# Patient Record
Sex: FEMALE | Race: WHITE
[De-identification: names, ages, dates, MRNs, and addresses within clinical notes are randomized per-mention and may not be internally consistent; named-entity substitution may affect disease eponyms.]

---
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
## 2020-04-22 ENCOUNTER — HOSPITAL ENCOUNTER (OUTPATIENT)
Dept: HOSPITAL 83 - MRI | Age: 20
End: 2020-04-22
Attending: FAMILY MEDICINE
Payer: COMMERCIAL

## 2020-04-22 DIAGNOSIS — N64.3: ICD-10-CM

## 2020-04-22 DIAGNOSIS — D35.2: Primary | ICD-10-CM

## 2020-06-25 ENCOUNTER — HOSPITAL ENCOUNTER (OUTPATIENT)
Dept: HOSPITAL 83 - US | Age: 20
Discharge: HOME | End: 2020-06-25
Attending: FAMILY MEDICINE
Payer: COMMERCIAL

## 2020-06-25 DIAGNOSIS — N64.4: ICD-10-CM

## 2020-06-25 DIAGNOSIS — N64.3: Primary | ICD-10-CM

## 2020-07-20 ENCOUNTER — HOSPITAL ENCOUNTER (EMERGENCY)
Dept: HOSPITAL 83 - ED | Age: 20
Discharge: HOME | End: 2020-07-20
Payer: COMMERCIAL

## 2020-07-20 VITALS — HEIGHT: 63.98 IN | BODY MASS INDEX: 35 KG/M2 | WEIGHT: 205 LBS

## 2020-07-20 VITALS — DIASTOLIC BLOOD PRESSURE: 68 MMHG
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
## 2020-07-20 DIAGNOSIS — J45.909: ICD-10-CM
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
## 2020-07-20 DIAGNOSIS — S90.31XA: Primary | ICD-10-CM

## 2020-09-01 RX ORDER — ARIPIPRAZOLE 5 MG/1
5 TABLET ORAL DAILY
COMMUNITY

## 2021-04-13 ENCOUNTER — HOSPITAL ENCOUNTER (EMERGENCY)
Dept: HOSPITAL 83 - ED | Age: 21
Discharge: HOME | End: 2021-04-13
Payer: COMMERCIAL

## 2021-04-13 VITALS — WEIGHT: 210 LBS | HEIGHT: 55 IN

## 2021-04-13 VITALS — SYSTOLIC BLOOD PRESSURE: 122 MMHG | DIASTOLIC BLOOD PRESSURE: 60 MMHG

## 2021-04-13 DIAGNOSIS — Z79.899: ICD-10-CM

## 2021-04-13 DIAGNOSIS — K21.9: ICD-10-CM

## 2021-04-13 DIAGNOSIS — M94.0: Primary | ICD-10-CM

## 2021-04-13 LAB
BASOPHILS # BLD AUTO: 0 10*3/UL (ref 0–0.1)
BASOPHILS NFR BLD AUTO: 0.4 % (ref 0–1)
BUN SERPL-MCNC: 10 MG/DL (ref 7–24)
CHLORIDE SERPL-SCNC: 107 MMOL/L (ref 98–107)
CREAT SERPL-MCNC: 0.84 MG/DL (ref 0.55–1.02)
EOSINOPHIL # BLD AUTO: 0.1 10*3/UL (ref 0–0.4)
EOSINOPHIL # BLD AUTO: 1.3 % (ref 1–4)
ERYTHROCYTE [DISTWIDTH] IN BLOOD BY AUTOMATED COUNT: 13.3 % (ref 0–14.5)
HCT VFR BLD AUTO: 42.3 % (ref 37–47)
LYMPHOCYTES # BLD AUTO: 2.2 10*3/UL (ref 1.3–4.4)
LYMPHOCYTES NFR BLD AUTO: 28.1 % (ref 27–41)
MCH RBC QN AUTO: 29.5 PG (ref 27–31)
MCHC RBC AUTO-ENTMCNC: 32.6 G/DL (ref 33–37)
MCV RBC AUTO: 90.4 FL (ref 81–99)
MONOCYTES # BLD AUTO: 0.4 10*3/UL (ref 0.1–1)
MONOCYTES NFR BLD MANUAL: 4.7 % (ref 3–9)
NEUT #: 5.1 10*3/UL (ref 2.3–7.9)
NEUT %: 65.4 % (ref 47–73)
NRBC BLD QL AUTO: 0 10*3/UL (ref 0–0)
PLATELET # BLD AUTO: 281 10*3/UL (ref 130–400)
PMV BLD AUTO: 10 FL (ref 9.6–12.3)
POTASSIUM SERPL-SCNC: 4 MMOL/L (ref 3.5–5.1)
RBC # BLD AUTO: 4.68 10*6/UL (ref 4.1–5.1)
SODIUM SERPL-SCNC: 139 MMOL/L (ref 136–145)
WBC NRBC COR # BLD AUTO: 7.8 10*3/UL (ref 4.8–10.8)

## 2021-08-12 ENCOUNTER — HOSPITAL ENCOUNTER (EMERGENCY)
Age: 21
Discharge: LEFT BEFORE DISCHARGE | End: 2021-08-12
Payer: COMMERCIAL

## 2021-08-12 VITALS
HEIGHT: 64 IN | TEMPERATURE: 98 F | WEIGHT: 210 LBS | OXYGEN SATURATION: 99 % | BODY MASS INDEX: 35.85 KG/M2 | HEART RATE: 118 BPM | RESPIRATION RATE: 18 BRPM | DIASTOLIC BLOOD PRESSURE: 63 MMHG | SYSTOLIC BLOOD PRESSURE: 126 MMHG

## 2021-08-13 ENCOUNTER — HOSPITAL ENCOUNTER (EMERGENCY)
Dept: HOSPITAL 83 - ED | Age: 21
LOS: 1 days | Discharge: HOME | End: 2021-08-14
Payer: COMMERCIAL

## 2021-08-13 VITALS — BODY MASS INDEX: 35.85 KG/M2 | WEIGHT: 210 LBS | HEIGHT: 63.98 IN

## 2021-08-13 VITALS — DIASTOLIC BLOOD PRESSURE: 63 MMHG

## 2021-08-13 DIAGNOSIS — G43.909: ICD-10-CM

## 2021-08-13 DIAGNOSIS — O26.891: Primary | ICD-10-CM

## 2021-08-13 DIAGNOSIS — K21.9: ICD-10-CM

## 2021-08-13 LAB
BASOPHILS # BLD AUTO: 0.1 10*3/UL (ref 0–0.1)
BASOPHILS NFR BLD AUTO: 0.4 % (ref 0–1)
EOSINOPHIL # BLD AUTO: 0.1 10*3/UL (ref 0–0.4)
EOSINOPHIL # BLD AUTO: 0.6 % (ref 1–4)
ERYTHROCYTE [DISTWIDTH] IN BLOOD BY AUTOMATED COUNT: 13.5 % (ref 0–14.5)
HCT VFR BLD AUTO: 38.7 % (ref 37–47)
LYMPHOCYTES # BLD AUTO: 2.3 10*3/UL (ref 1.3–4.4)
LYMPHOCYTES NFR BLD AUTO: 16.9 % (ref 27–41)
MCH RBC QN AUTO: 29.6 PG (ref 27–31)
MCHC RBC AUTO-ENTMCNC: 32.6 G/DL (ref 33–37)
MCV RBC AUTO: 91.1 FL (ref 81–99)
MONOCYTES # BLD AUTO: 0.7 10*3/UL (ref 0.1–1)
MONOCYTES NFR BLD MANUAL: 4.9 % (ref 3–9)
NEUT #: 10.4 10*3/UL (ref 2.3–7.9)
NEUT %: 76.8 % (ref 47–73)
NRBC BLD QL AUTO: 0 % (ref 0–0)
PLATELET # BLD AUTO: 278 10*3/UL (ref 130–400)
PMV BLD AUTO: 10.3 FL (ref 9.6–12.3)
RBC # BLD AUTO: 4.25 10*6/UL (ref 4.1–5.1)
WBC NRBC COR # BLD AUTO: 13.6 10*3/UL (ref 4.8–10.8)

## 2021-09-03 ENCOUNTER — HOSPITAL ENCOUNTER (EMERGENCY)
Dept: HOSPITAL 97 - ER | Age: 21
LOS: 1 days | Discharge: HOME | End: 2021-09-04
Payer: COMMERCIAL

## 2021-09-03 DIAGNOSIS — O23.01: Primary | ICD-10-CM

## 2021-09-03 DIAGNOSIS — Z3A.00: ICD-10-CM

## 2021-09-03 DIAGNOSIS — Z20.822: ICD-10-CM

## 2021-09-03 DIAGNOSIS — N12: ICD-10-CM

## 2021-09-03 LAB
ALBUMIN SERPL BCP-MCNC: 3.2 G/DL (ref 3.4–5)
ALP SERPL-CCNC: 87 U/L (ref 45–117)
ALT SERPL W P-5'-P-CCNC: 36 U/L (ref 12–78)
AST SERPL W P-5'-P-CCNC: 18 U/L (ref 15–37)
BUN BLD-MCNC: 11 MG/DL (ref 7–18)
GLUCOSE SERPLBLD-MCNC: 108 MG/DL (ref 74–106)
HCT VFR BLD CALC: 38.3 % (ref 36–45)
LYMPHOCYTES # SPEC AUTO: 1.9 K/UL (ref 0.7–4.9)
PMV BLD: 8.8 FL (ref 7.6–11.3)
POTASSIUM SERPL-SCNC: 3.4 MMOL/L (ref 3.5–5.1)
RBC # BLD: 4.37 M/UL (ref 3.86–4.86)
SP GR UR: 1.02 (ref 1–1.03)

## 2021-09-03 PROCEDURE — 83605 ASSAY OF LACTIC ACID: CPT

## 2021-09-03 PROCEDURE — 84702 CHORIONIC GONADOTROPIN TEST: CPT

## 2021-09-03 PROCEDURE — 87088 URINE BACTERIA CULTURE: CPT

## 2021-09-03 PROCEDURE — 36415 COLL VENOUS BLD VENIPUNCTURE: CPT

## 2021-09-03 PROCEDURE — 96365 THER/PROPH/DIAG IV INF INIT: CPT

## 2021-09-03 PROCEDURE — 76817 TRANSVAGINAL US OBSTETRIC: CPT

## 2021-09-03 PROCEDURE — 96375 TX/PRO/DX INJ NEW DRUG ADDON: CPT

## 2021-09-03 PROCEDURE — 81003 URINALYSIS AUTO W/O SCOPE: CPT

## 2021-09-03 PROCEDURE — 87040 BLOOD CULTURE FOR BACTERIA: CPT

## 2021-09-03 PROCEDURE — 99284 EMERGENCY DEPT VISIT MOD MDM: CPT

## 2021-09-03 PROCEDURE — 80053 COMPREHEN METABOLIC PANEL: CPT

## 2021-09-03 PROCEDURE — 86900 BLOOD TYPING SEROLOGIC ABO: CPT

## 2021-09-03 PROCEDURE — 85025 COMPLETE CBC W/AUTO DIFF WBC: CPT

## 2021-09-03 PROCEDURE — 87086 URINE CULTURE/COLONY COUNT: CPT

## 2021-09-03 PROCEDURE — 81015 MICROSCOPIC EXAM OF URINE: CPT

## 2021-09-03 PROCEDURE — 76775 US EXAM ABDO BACK WALL LIM: CPT

## 2021-09-03 PROCEDURE — 86901 BLOOD TYPING SEROLOGIC RH(D): CPT

## 2021-09-03 PROCEDURE — 81025 URINE PREGNANCY TEST: CPT

## 2021-09-04 VITALS — SYSTOLIC BLOOD PRESSURE: 100 MMHG | OXYGEN SATURATION: 99 % | TEMPERATURE: 98.4 F | DIASTOLIC BLOOD PRESSURE: 59 MMHG

## 2021-09-04 NOTE — EDPHYS
Physician Documentation                                                                           

 [facility]                                                                 

Name: Alyssa Chatterjee                                                                           

Age: 21 yrs                                                                                       

Sex: Female                                                                                       

: 2000                                                                                   

MRN: [id number]                                                                                   

Arrival Date: 2021                                                                          

Time: 20:40                                                                                       

Account#: [id number]                                                                            

Bed Treatment                                                                                     

Private MD:                                                                                       

ED Physician Spencer Meng                                                                      

HPI:                                                                                              

                                                                                             

22:10 This 21 yrs old  Female presents to ER via Wheelchair with complaints of       mh7 

      Urinary Problem, Vomiting.                                                                  

22:10 The patient complains of pain in the right flank.                                       mh7 

22:10 The pain radiates to the Lower abdomen. Onset: The symptoms/episode began/occurred 3    mh7 

      day(s) ago. Modifying factors: The symptoms are alleviated by nothing. the symptoms are     

      aggravated by movement, palpation/percussion. Associated signs and symptoms: Pertinent      

      positives: dysuria, fever, hematuria, nausea, vomiting, Pertinent negatives: diarrhea,      

      dizziness, headache, pain radiating to the lower extremities. Severity of pain: At its      

      worst the pain was moderate 2 day(s) ago, in the emergency department the pain is           

      unchanged. The patient has been recently seen by a physician: earlier today, Seen at an     

      outside hospital ED.                                                                        

                                                                                             

01:54 Patient reports having a miscarriage 2021. She states that she recently saw an     7 

      OB/GYN and has had a positive pregnancy test and beta hCG of nearly 500 in the past         

      week..                                                                                      

                                                                                                  

OB/GYN:                                                                                           

                                                                                             

21:14 Pregnancy Verified                                                                      kg  

                                                                                                  

Historical:                                                                                       

- Allergies:                                                                                      

21:14 No Known Allergies;                                                                     kg  

- Home Meds:                                                                                      

21:14 None [Active];                                                                          kg  

- PMHx:                                                                                           

21:14 Anxiety; Depressive disorder; Asthma; PCOS;                                             kg  

- PSHx:                                                                                           

21:14 None;                                                                                   kg  

                                                                                                  

- Immunization history:: Adult Immunizations not up to date, Client reports having NOT            

  received the Covid vaccine.                                                                     

- Social history:: Smoking status: Patient denies any tobacco usage or history of.                

                                                                                                  

                                                                                                  

ROS:                                                                                              

22:10 Eyes: Negative for injury, pain, redness, and discharge, ENT: Negative for injury,      mh7 

      pain, and discharge, Neck: Negative for injury, pain, and swelling, Cardiovascular:         

      Negative for chest pain, palpitations, and edema, Respiratory: Negative for shortness       

      of breath, cough, wheezing, and pleuritic chest pain, MS/Extremity: Negative for injury     

      and deformity, Skin: Negative for injury, rash, and discoloration, Neuro: Negative for      

      headache, weakness, numbness, tingling, and seizure, Psych: Negative for depression,        

      anxiety, suicide ideation, homicidal ideation, and hallucinations, Allergy/Immunology:      

      Negative for hives, rash, and allergies, Endocrine: Negative for neck swelling,             

      polydipsia, polyuria, polyphagia, and marked weight changes, Hematologic/Lymphatic:         

      Negative for swollen nodes, abnormal bleeding, and unusual bruising.                        

                                                                                                  

Exam:                                                                                             

22:10 Constitutional:  This is a well developed, well nourished patient who is awake, alert,  mh7 

      and in no acute distress. Head/Face:  Normocephalic, atraumatic. Eyes:  Pupils equal        

      round and reactive to light, extra-ocular motions intact.  Lids and lashes normal.          

      Conjunctiva and sclera are non-icteric and not injected.  Cornea within normal limits.      

      Periorbital areas with no swelling, redness, or edema. Neck:  Trachea midline, no           

      thyromegaly or masses palpated, and no cervical lymphadenopathy.  Supple, full range of     

      motion without nuchal rigidity, or vertebral point tenderness.  No Meningismus.             

      Chest/axilla:  Normal chest wall appearance and motion.  Nontender with no deformity.       

      No lesions are appreciated. Respiratory:  Lungs have equal breath sounds bilaterally,       

      clear to auscultation and percussion.  No rales, rhonchi or wheezes noted.  No              

      increased work of breathing, no retractions or nasal flaring.                               

22:10 Skin:  Warm, dry with normal turgor.  Normal color with no rashes, no lesions, and no       

      evidence of cellulitis. MS/ Extremity:  Pulses equal, no cyanosis.  Neurovascular           

      intact.  Full, normal range of motion. Neuro:  Awake and alert, GCS 15, oriented to         

      person, place, time, and situation.  Cranial nerves II-XII grossly intact.  Motor           

      strength 5/5 in all extremities.  Sensory grossly intact.  Cerebellar exam normal.          

      Normal gait. Psych:  Awake, alert, with orientation to person, place and time.              

      Behavior, mood, and affect are within normal limits.                                        

22:10 Abdomen/GI: Inspection: obese Bowel sounds: normal, in all quadrants, Palpation: mild       

      abdominal tenderness, in the suprapubic area, mass, is not appreciated, rebound             

      tenderness, is not appreciated, voluntary guarding, is not appreciated, involuntary         

      guarding, is not appreciated, no appreciated organomegaly, Rectal exam: the exam is         

      deferred, because of patient request, Indicators: McBurney's point is not tender,           

      Hylton's sign is negative, Rovsing's sign is negative, Obturator sign is negative,          

      Psoas sign is negative, Liver: no appreciated palpable abnormalities, Hernia: not           

      appreciated.                                                                                

22:10 Back: ROM is normal, normal spinal alignment noted, CVA tenderness, that is mild, is    mh7 

      noted on the right, muscle spasm, is not present.                                           

                                                                                                  

Vital Signs:                                                                                      

21:12  / 64; Pulse 121; Resp 20; Temp 100.1; Pulse Ox 100% on R/A; Weight 95.25 kg (R); kg  

      Height 5 ft. 4 in. (162.56 cm) (R); Pain 10/10;                                             

23:35  / 76; Pulse 102; Resp 18; Pulse Ox 100% on R/A;                                  em  

                                                                                             

01:49  / 59; Pulse 102; Resp 16 S; Temp 98.4; Pulse Ox 99% ;                            bb  

                                                                                             

21:12 Body Mass Index 36.05 (95.25 kg, 162.56 cm)                                             kg  

                                                                                                  

MDM:                                                                                              

01:54 Differential diagnosis: nephrolithiasis, pyelonephritis, UTI. Data reviewed: vital      mh7 

      signs, nurses notes, lab test result(s), Beta HCG: CBC, electrolytes, urinalysis,           

      radiologic studies, ultrasound. Data interpreted: Pulse oximetry: on room air is 99 %.      

      Interpretation: normal. Counseling: I had a detailed discussion with the patient and/or     

      guardian regarding: the historical points, exam findings, and any diagnostic results        

      supporting the discharge/admit diagnosis, lab results, radiology results. Response to       

      treatment: the patient's symptoms have markedly improved after treatment, the patient       

      is now symptom free. Refusal of service: The patient/guardian displays adequate             

      decision making capability and despite a detailed discussion of alternatives, benefits,     

      risks, and consequences refuses: Admission to the hospital for further work-up and          

      treatment.                                                                                  

01:54 ED course: Well-appearing, no acute distress, vital signs stable, no focal neurological mh7 

      deficits. Patient reports baseline heart rate usually in the 110's-120's. Discussed all     

      test results and findings with the patient and recommendation for admission with            

      current diagnosis due to possible early pregnancy. Patient declines admission stating       

      that she feels well and wants to try oral medication at home. She will follow up with       

      her OB/GYN doctor but will return to ER if worsening symptoms or other urgent concerns..    

02:01 Patient medically screened.                                                             [facility] 

                                                                                                  

                                                                                             

20:40 Order name: CBC with Automated Diff; Complete Time: 21:56                               [facility]

                                                                                             

20:40 Order name: Comprehensive Metabolic Panel; Complete Time: 23:26                         [facility]

                                                                                             

20:40 Order name: Urine Dipstick-Ancillary; Complete Time: 21:56                              [facility]

                                                                                             

20:55 Order name: Lactate                                                                       

                                                                                             

20:55 Order name: Blood Culture Adult (2)                                                       

                                                                                             

20:55 Order name: Lactate; Complete Time: 21:56                                               [facility]

                                                                                             

20:56 Order name: Urine Pregnancy--Ancillary (enter results); Complete Time: 22:16            [facility] 

                                                                                             

21:57 Order name: Urine Microscopic Only; Complete Time: 00:08                                [facility] 

                                                                                             

21:57 Order name: Urine Culture                                                               [facility] 

                                                                                             

22:17 Order name: HCG-Quantitative; Complete Time: 23:26                                      [facility] 

                                                                                             

22:17 Order name: Abo/rh Typing; Complete Time: 23:26                                         [facility] 

                                                                                             

00:31 Order name: SARS-COV-2 RT PCR; Complete Time: 00:44                                     [facility]

                                                                                             

01:37 Order name: ABO/RH no charge; Complete Time: 01:41                                      [facility]

                                                                                             

22:17 Order name: US Transvaginal Ob                                                          [facility] 

                                                                                             

23:14 Order name: Renal Ultrasound-Limited                                                    [facility]

                                                                                                  

Administered Medications:                                                                         

                                                                                             

20:55 Drug: Tylenol 650 mg Route: PO;                                                         kg  

23:20 Drug: Rocephin (cefTRIAXone) 1 grams Route: IV; Rate: per protocol; Site: left          kg  

      antecubital;                                                                                

                                                                                             

00:09 Follow up: Response: No adverse reaction; IV Status: Completed infusion; IV Intake: 10mlem  

                                                                                             

23:25 Drug: Zofran (Ondansetron) 4 mg Route: IVP; Site: left antecubital;                     kg  

                                                                                             

00:09 Follow up: Response: No adverse reaction                                                em  

                                                                                             

23:27 Drug: NS 0.9% 1000 ml Route: IV; Rate: 1000 ml; Site: left antecubital;                 kg  

                                                                                             

00:09 Follow up: IV Status: Completed infusion; IV Intake: 1000ml                             em  

                                                                                             

23:27 Drug: morphine 4 mg Route: IVP; Site: left antecubital;                                 kg  

                                                                                             

00:09 Follow up: Response: No adverse reaction; Marked relief of symptoms; Pain is decreased; em  

      RASS: Alert and Calm (0)                                                                    

                                                                                                  

                                                                                                  

Disposition Summary:                                                                              

21 02:01                                                                                    

Discharge Ordered                                                                                 

      Location: Home                                                                          [facility] 

      Problem: new                                                                            [facility] 

      Symptoms: have improved                                                                 [facility] 

      Condition: Stable                                                                       [facility] 

      Diagnosis                                                                                   

        - Pyelonephritis                                                                      [facility] 

      Followup:                                                                               [facility] 

        - With: Private Physician                                                                  

        - When: 1 - 2 days                                                                         

        - Reason: If symptoms return, Worsening of condition, Recheck today's complaints,          

      Continuance of care, Re-evaluation by your physician                                        

      Followup:                                                                               [facility] 

        - With: Rishi Nicolas MD                                                                

        - When: 1 - 2 days                                                                         

        - Reason: Worsening of condition, Recheck today's complaints                               

      Discharge Instructions:                                                                     

        - Discharge Summary Sheet                                                             [facility] 

        - Pyelonephritis, Adult, Easy-to-Read                                                 [facility] 

        - Pyelonephritis During Pregnancy                                                     [facility] 

      Forms:                                                                                      

        - Medication Reconciliation Form                                                      [facility] 

        - Thank You Letter                                                                    [facility] 

        - Antibiotic Education                                                                [facility] 

        - Prescription Opioid Use                                                             [facility] 

      Prescriptions:                                                                              

        - ondansetron 4 mg Oral tablet,disintegrating                                              

            - place 1 tablet by TRANSLINGUAL route every 8 hours As needed; 10 tablet;        [facility] 

      Refills: 0, Product Selection Permitted                                                     

        - Augmentin 875-125 mg Oral Tablet                                                         

            - take 1 tablet by ORAL route every 12 hours for 14 days; 28 tablet; Refills: 0,  [facility] 

      Product Selection Permitted                                                                 

        - Tylenol-Codeine #3 300 mg-30 mg Oral                                                     

            - take 2 tablet by ORAL route every 6 hours As needed; 20 tablet; Refills: 0,     [facility] 

      Product Selection Permitted                                                                 

Signatures:                                                                                       

Dispatcher [employer]                           EDMS                                                 

Domingo Boom Valenzuela, FNP-C                      FNP-Cla1                                                  

Spencer Meng MD MD   7                                                  

Lorena Joseph RN                     RN   kg                                                   

Jacky Hwang RN   em                                                   

                                                                                                  

Corrections: (The following items were deleted from the chart)                                    

                                                                                             

22:36 20:57 CORONAVIRUS+MR.LAB.BRZ ordered. EDMS                                              EDMS

23:14 22:19 Abdomen Limited+US.RAD.BRZ ordered. EDMS                                          EDMS

                                                                                                  

**************************************************************************************************

## 2021-09-04 NOTE — ER
Nurse's Notes                                                                                     

 [facility] [place]                                                                 

Name: Alyssa Chatterjee                                                                           

Age: 21 yrs                                                                                       

Sex: Female                                                                                       

: 2000                                                                                   

MRN: [id number]                                                                                   

Arrival Date: 2021                                                                          

Time: 20:40                                                                                       

Account#: [id number]                                                                            

Bed Treatment                                                                                     

Private MD:                                                                                       

Diagnosis: Pyelonephritis                                                                         

                                                                                                  

Presentation:                                                                                     

                                                                                             

21:12 Chief complaint: Patient states: I was diagnosed with kidney infection this morning at  [facility]. They gave me a shot of antibiotic and gave me a                

      prescription. I haven't gotten the prescription filled and I just fell worse.               

      Coronavirus screen: Vaccine status: Patient reports being unvaccinated. Ebola Screen:       

      Patient negative for fever greater than or equal to 101.5 degrees Fahrenheit, and           

      additional compatible Ebola Virus Disease symptoms Patient denies exposure to               

      infectious person. Patient denies travel to an Ebola-affected area in the 21 days           

      before illness onset. Initial Sepsis Screen: Does the patient meet any 2 criteria? HR >     

      90 bpm. No. Patient's initial sepsis screen is negative. Does the patient have a            

      suspected source of infection? Yes: Dysuria/Frequency/Urgency/UTI. Risk Assessment: Do      

      you want to hurt yourself or someone else? Patient reports no desire to harm self or        

      others. Onset of symptoms was September 3, 2021.                                            

21:12 Method Of Arrival: Wheelchair                                                           kg  

21:12 Acuity: ROSARIO 3                                                                           kg  

                                                                                                  

Triage Assessment:                                                                                

21:14 General: Appears in no apparent distress. Behavior is calm, cooperative, appropriate    kg  

      for age. Pain: Complains of pain in Right lower back, Righ flank, right lower abdomen       

      Pain currently is 10 out of 10 on a pain scale. GI: Reports lower abdominal pain. :       

      Reports pain flank(s), in lower back with urination, Pain is 10 out of 10 on a pain         

      scale.                                                                                      

                                                                                                  

OB/GYN:                                                                                           

21:14 Pregnancy Verified                                                                      kg  

                                                                                                  

Historical:                                                                                       

- Allergies:                                                                                      

21:14 No Known Allergies;                                                                     kg  

- Home Meds:                                                                                      

21:14 None [Active];                                                                          kg  

- PMHx:                                                                                           

21:14 Anxiety; Depressive disorder; Asthma; PCOS;                                             kg  

- PSHx:                                                                                           

21:14 None;                                                                                   kg  

                                                                                                  

- Immunization history:: Adult Immunizations not up to date, Client reports having NOT            

  received the Covid vaccine.                                                                     

- Social history:: Smoking status: Patient denies any tobacco usage or history of.                

                                                                                                  

                                                                                                  

Screenin:15 Abuse screen: Denies threats or abuse. Nutritional screening: No deficits noted.        em  

      Tuberculosis screening: No symptoms or risk factors identified. Fall Risk None              

      identified.                                                                                 

                                                                                                  

Assessment:                                                                                       

22:15 General: Appears in no apparent distress. uncomfortable, Behavior is calm, cooperative, em  

      appropriate for age. Pain: Complains of pain in suprapubic area. Neuro: Level of            

      Consciousness is awake, alert, obeys commands, Oriented to person, place, time,             

      situation. Cardiovascular: Capillary refill < 3 seconds Patient's skin is warm and dry.     

      Respiratory: Airway is patent Respiratory effort is even, unlabored, Respiratory            

      pattern is regular, symmetrical. GI: Abdomen is obese. : Reports burning with             

      urination. Derm: Skin is intact, is healthy with good turgor, Skin is pink, warm \T\ dry.   

      Musculoskeletal: Capillary refill < 3 seconds, Range of motion: intact in all               

      extremities.                                                                                

23:35 Reassessment: Patient appears in no apparent distress at this time. Patient and/or      em  

      family updated on plan of care and expected duration. Pain level reassessed. Patient is     

      alert, oriented x 3, equal unlabored respirations, skin warm/dry/pink. Patient states       

      feeling better.                                                                             

                                                                                             

01:20 Reassessment: Patient appears in no apparent distress at this time. Patient and/or      em  

      family updated on plan of care and expected duration. Pain level reassessed. Patient is     

      alert, oriented x 3, equal unlabored respirations, skin warm/dry/pink.                      

02:30 Reassessment: Patient is alert, oriented x 3, equal unlabored respirations, skin        bb  

      warm/dry/pink. pt verbalized understanding of and agrees to plan of care discharge          

      instructions given pt ambulated with steady gait to exit.                                   

                                                                                                  

Vital Signs:                                                                                      

                                                                                             

21:12  / 64; Pulse 121; Resp 20; Temp 100.1; Pulse Ox 100% on R/A; Weight 95.25 kg (R); kg  

      Height 5 ft. 4 in. (162.56 cm) (R); Pain 10/10;                                             

23:35  / 76; Pulse 102; Resp 18; Pulse Ox 100% on R/A;                                  em  

                                                                                             

01:49  / 59; Pulse 102; Resp 16 S; Temp 98.4; Pulse Ox 99% ;                            bb  

                                                                                             

21:12 Body Mass Index 36.05 (95.25 kg, 162.56 cm)                                             kg  

                                                                                                  

ED Course:                                                                                        

                                                                                             

20:40 Patient arrived in ED.                                                                  mr  

21:14 Triage completed.                                                                       kg  

21:14 Arm band placed on right wrist.                                                         kg  

21:16 Inserted saline lock: 20 gauge in left antecubital area, using aseptic technique.       kg  

21:50 Spencer Meng MD is Attending Physician.                                             [id number] 

22:04 Jacky Hwang, RN is Primary Nurse.                                                      em  

22:15 Patient has correct armband on for positive identification.                             em  

22:56 US Transvaginal Ob In Process Unspecified.                                              EDMS

23:14 Renal Ultrasound-Limited In Process Unspecified.                                        EDMS

                                                                                             

02:00 Rishi Nicolas MD is Referral Physician.                                              7 

02:29 IV discontinued, intact, bleeding controlled, No redness/swelling at site. Pressure     bb  

      dressing applied.                                                                           

02:32 No provider procedures requiring assistance completed.                                  bb  

                                                                                                  

Administered Medications:                                                                         

                                                                                             

20:55 Drug: Tylenol 650 mg Route: PO;                                                         kg  

23:20 Drug: Rocephin (cefTRIAXone) 1 grams Route: IV; Rate: per protocol; Site: left          kg  

      antecubital;                                                                                

                                                                                             

00:09 Follow up: Response: No adverse reaction; IV Status: Completed infusion; IV Intake: 10mlem  

                                                                                             

23:25 Drug: Zofran (Ondansetron) 4 mg Route: IVP; Site: left antecubital;                     kg  

                                                                                             

00:09 Follow up: Response: No adverse reaction                                                em  

                                                                                             

23:27 Drug: NS 0.9% 1000 ml Route: IV; Rate: 1000 ml; Site: left antecubital;                 kg  

                                                                                             

00:09 Follow up: IV Status: Completed infusion; IV Intake: 1000ml                             em  

                                                                                             

23:27 Drug: morphine 4 mg Route: IVP; Site: left antecubital;                                 kg  

                                                                                             

00:09 Follow up: Response: No adverse reaction; Marked relief of symptoms; Pain is decreased; em  

      RASS: Alert and Calm (0)                                                                    

                                                                                                  

                                                                                                  

Intake:                                                                                           

00:09 IV: 1000ml; Total: 1000ml.                                                              em  

00:09 IV: 10ml; Total: 1010ml.                                                                em  

                                                                                                  

Outcome:                                                                                          

02:01 Discharge ordered by MD.                                                                7 

02:32 Discharged to home ambulatory, with family.                                             bb  

02:32 Condition: stable                                                                           

02:32 Discharge instructions given to patient, Instructed on discharge instructions, follow       

      up and referral plans. medication usage, Demonstrated understanding of instructions,        

      follow-up care, medications, Prescriptions given X 3.                                       

02:32 Patient left the ED.                                                                    bb  

                                                                                                  

Signatures:                                                                                       

Dispatcher [employer]                           ANTONIO                                                 

DomingoJanette                                 mr HwangJacky, RN                        RN   Indy Valera RN                     RN   Spencer Otto MD MD   [facility]                                                  

Lorena Joseph RN                     RN   kg                                                   

                                                                                                  

**************************************************************************************************

## 2021-09-04 NOTE — RAD REPORT
EXAM DESCRIPTION:  US - Transvaginal OB - 9/3/2021 10:57 pm

 

CLINICAL HISTORY:  21 years Female PAIN, LMP: 6/28/2021. Patient states miscarriage on 8/13/2021. Pos
itive pregnancy test 3 days ago and today in the emergency room. History of PCOS.

 

TECHNIQUE:  Ultrasound imaging of the pelvis was performed transabdominally and endovaginally on 9/3/
2021 at 10: 41 PM.

 

COMPARISON:  None

 

FINDINGS:  The uterus is normal in size, shape and echogenicity and measures 6.8 x 3.8 x 5.2 cm.   Th
e endometrial complex was not measured but appears to be within normal limits.   There is no endometr
ial fluid. There is no gestational sac within the endometrial canal. There is a small focal anechoic 
area within the lower uterine segment measuring approximately 0.7 x 0.2 x 0.5 cm.

The right ovary measures 3.7 x 1.7 x 2.8 cm.   The right ovary contains normal follicles.   Doppler i
maging demonstrates normal spectral and color Doppler flow.

The left ovary measures 3.0 x 1.2 x 2.2 cm.   The left ovary contains normal follicles.   Doppler alexandria
ging demonstrates normal spectral waveforms and color Doppler flow.

There is a small amount of free fluid surrounding the right ovary.

 

IMPRESSION:  1. No evidence of an intrauterine gestational sac. There is a small focal anechoic area 
in the lower uterine segment with an average diameter of 0.5 cm. This could represent a nabothian cys
t or low position of a very early gestational sac.

2. Grossly normal sonographic evaluation of the ovaries.

3. Small amount of free fluid surrounding the right ovary.

 

Electronically signed by:   Hayley Alvarez DO   9/3/2021 11:46 PM CDT Workstation: [id number]

 

 

Due to temporary technical issues with the PACS/Fluency reporting system, reports are being signed by
 the in house radiologists without review as a courtesy to insure prompt reporting. The interpreting 
radiologist is fully responsible for the content of the report.

## 2021-09-04 NOTE — RAD REPORT
EXAM DESCRIPTION:  US - Renal Ultrasound-Limited - 9/3/2021 11:15 pm

 

CLINICAL HISTORY:  FLANK PAIN

 

COMPARISON:  None.

 

TECHNIQUE:  Real-time sonographic images of the retroperitoneum were obtained using a curved multiher
tz transducer.

 

FINDINGS:  The visualized portions of the aorta and IVC are unremarkable.

The right kidney measures 12.9 cm in length. The left kidney measures 11.0 cm in length. Mild right h
ydronephrosis. No left hydronephrosis. No solid renal mass.

The urinary bladder is nonvisualized.

 

IMPRESSION:  1. Mild right hydronephrosis.

 

Electronically signed by:   Ac Dominguez   9/4/2021 12:58 AM CDT Workstation: [id number]

 

 

 

Due to temporary technical issues with the PACS/Fluency reporting system, reports are being signed by
 the in house radiologists without review as a courtesy to insure prompt reporting. The interpreting 
radiologist is fully responsible for the content of the report.

## 2021-12-06 ENCOUNTER — HOSPITAL ENCOUNTER (EMERGENCY)
Dept: HOSPITAL 97 - ER | Age: 21
Discharge: HOME | End: 2021-12-06
Payer: COMMERCIAL

## 2021-12-06 VITALS — DIASTOLIC BLOOD PRESSURE: 72 MMHG | SYSTOLIC BLOOD PRESSURE: 113 MMHG | OXYGEN SATURATION: 100 % | TEMPERATURE: 98.6 F
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
## 2021-12-06 DIAGNOSIS — S90.32XA: Primary | ICD-10-CM
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
## 2021-12-06 PROCEDURE — 99283 EMERGENCY DEPT VISIT LOW MDM: CPT

## 2021-12-06 NOTE — EDPHYS
Physician Documentation                                                                           

 [facility]                                                                 

Name: Alyssa Chatterjee                                                                           

Age: 21 yrs                                                                                       

Sex: Female                                                                                       

: 2000                                                                                   

MRN: [id number]                                                                                   

Arrival Date: 2021                                                                          

Time: 08:27                                                                                       

Account#: [id number]                                                                            

Bed Waiting                                                                                       

Private MD:                                                                                       

KYLE Physician Ajit Lara                                                                      

HPI:                                                                                              

                                                                                             

09:39 This 21 yrs old Female presents to ER via Wheelchair with complaints of Foot Injury.    kb  

09:39 The patient presents with pain, swelling, tenderness. The complaints affect the left    kb  

      foot. Context: The problem was sustained at work, resulted from wheelchair rolled over      

      foot, the patient can fully bear weight, the patient is able to ambulate. Onset: The        

      symptoms/episode began/occurred 5 day(s) ago. Modifying factors: The symptoms are           

      alleviated by nothing, the symptoms are aggravated by weight bearing, movement.             

      Associated signs and symptoms: Pertinent positives: swelling, Pertinent negatives: calf     

      tenderness, fever, nausea, numbness, rash, tingling, vomiting, warmth, weakness.            

      Severity of symptoms: At their worst the symptoms were moderate, in the emergency           

      department the symptoms are unchanged. The patient has not experienced similar symptoms     

      in the past. The patient has not recently seen a physician.                                 

                                                                                                  

OB/GYN:                                                                                           

08:36 LMP 2021                                                                              iw  

                                                                                                  

Historical:                                                                                       

- Allergies:                                                                                      

08:35 No Known Allergies;                                                                     iw  

- PMHx:                                                                                           

08:35 Anxiety; Asthma; depressive disorder; PCOS;                                             iw  

- PSHx:                                                                                           

08:35 None;                                                                                   iw  

                                                                                                  

- Immunization history:: Adult Immunizations up to date.                                          

- Social history:: Smoking status: Reported history of juuling and/or vaping.                     

                                                                                                  

                                                                                                  

ROS:                                                                                              

09:35 Constitutional: Negative for fever, chills, and weight loss.                            kb  

09:35 MS/extremity: Positive for pain, swelling, tenderness, of the left foot.                    

09:35 All other systems are negative.                                                             

                                                                                                  

Exam:                                                                                             

09:35 Constitutional:  This is a well developed, well nourished patient who is awake, alert,  kb  

      and in no acute distress. Head/Face:  Normocephalic, atraumatic. ENT:  Moist Mucous         

      membranes Respiratory:  Respirations even and unlabored. No increased work of               

      breathing. Talking in full sentences Skin:  Warm, dry with normal turgor.  Normal           

      color. Neuro:  Awake and alert, GCS 15, oriented to person, place, time, and situation.     

      Moves all extremities. Normal gait. Psych:  Awake, alert, with orientation to person,       

      place and time.  Behavior, mood, and affect are within normal limits.                       

09:35 Musculoskeletal/extremity: Extremities: grossly normal except: noted in the left foot:      

      contusion, pain, swelling, tenderness, ROM: limited active range of motion due to pain,     

      in the left foot, Circulation is intact in all extremities. Sensation intact. Weight        

      bearing: able to fully bear weight.                                                         

                                                                                                  

Vital Signs:                                                                                      

08:34  / 72; Pulse 98; Resp 18; Temp 98.6; Pulse Ox 100% ; Weight 97.52 kg; Height 5    iw  

      ft. 4 in. (162.56 cm); Pain 4/10;                                                           

08:34 Body Mass Index 36.90 (97.52 kg, 162.56 cm)                                             iw  

                                                                                                  

MDM:                                                                                              

08:38 Patient medically screened.                                                             kb  

09:34 Data reviewed: vital signs, nurses notes. Data interpreted: Pulse oximetry: on room air kb  

      is 100 %. Interpretation: normal.                                                           

                                                                                                  

                                                                                             

08:36 Order name: Foot Left 3 View XRAY; Complete Time: 10:28                                 iw  

                                                                                                  

Administered Medications:                                                                         

No medications were administered                                                                  

                                                                                                  

                                                                                                  

Disposition:                                                                                      

                                                                                             

06:53 Co-signature as Attending Physician, Ajit Lara MD I agree with the assessment and  onofre 

      plan of care.                                                                               

                                                                                                  

Disposition Summary:                                                                              

21 10:31                                                                                    

Discharge Ordered                                                                                 

      Location: Home                                                                          kb  

      Condition: Stable                                                                       kb  

      Diagnosis                                                                                   

        - Contusion of left foot                                                              kb  

      Followup:                                                                               kb  

        - With: Emergency Department                                                               

        - When: As needed                                                                          

        - Reason: Worsening of condition                                                           

      Followup:                                                                               kb  

        - With: Private Physician                                                                  

        - When: 2 - 3 days                                                                         

        - Reason: Recheck today's complaints, Continuance of care, Re-evaluation by your           

      physician                                                                                   

      Discharge Instructions:                                                                     

        - Discharge Summary Sheet                                                             kb  

        - Foot Contusion, Easy-to-Read                                                        kb  

      Forms:                                                                                      

        - Medication Reconciliation Form                                                      kb  

        - Thank You Letter                                                                    kb  

        - Antibiotic Education                                                                kb  

        - Prescription Opioid Use                                                             kb  

        - Work release form                                                                   iw  

Signatures:                                                                                       

Dispatcher [employer]                           Jeannie Lauren, ENID BANDA-Ajit Allen MD MD cha Williams, Irene, RN                     RN   iw                                                   

                                                                                                  

**************************************************************************************************

## 2021-12-06 NOTE — XMS REPORT
Continuity of Care Document

                           Created on:2021



Patient:RAMESH JORDAN

Sex:Female

:2000

External Reference #:[id number]





Demographics







                          Address                   [address]



                                                    [place] [address]

 

                          Home Phone                [phone]

 

                          Email Address             [email or web address]

[email or web address]

 

                          Preferred Language        English

 

                          Marital Status            Unknown

 

                          Jew Affiliation     non-Restorationism

 

                          Race                      Unknown

 

                          Additional Race(s)        Unavailable



                                                    White

 

                          Ethnic Group              Unknown









Author







                          Organization              [facility]

t

 

                          Address                   [address]



                                                    [place] [address]

 

                          Phone                     [phone]









Support







                Name            Relationship    Address         Phone

 

                PHYSICIAN       Primary Care Physician Unavailable     Unavailab

radha HELMS MD  A   Emergency Provider [facility] [phone]



                                                [place] [address]  

 

                NIKKI          natural parent  [id number] Unavailable



                                                [place] [address] 









Care Team Providers







                    Name                Role                Phone

 

                    G_Pappas            Attending Clinician Unavailable

 

                    G_Pappas            Admitting Clinician Unavailable









Payers







           Payer Name Policy Type Policy Number Effective Date Expiration Date S

ourmamadou

 

           [place]            [id number] 2019 00:00:00         

   



           [place]                                               







Problems







       Condition Condition Condition Status Onset  Resolution Last   Treating Co

mments 

Source



       Name   Details Category        Date   Date   Treatment Clinician        



                                                 Date                 

 

       Polycystic Polycystic Problem Active                                    [facility]

[facility]



       ovary  [facility]                                                   [facility]



       syndrome Syndrome                                                  [facility]

l



                                                                      Group







Allergies, Adverse Reactions, Alerts

This patient has no known allergies or adverse reactions.



Social History







                Smoking Status  Start Date      Stop Date       Source

 

                Current Some Day Smoker                                 [facility]







Medications

This patient has no known medications.



Vital Signs







             Vital Name   Observation Time Observation Value Comments     Source

 

             BP Systolic  2021-10-25 00:00:00 117 mm[Hg]                [facility]

[facility]



                                                                 Group

 

             Body Weight  2021-10-25 00:00:00 221.7 [lb_av]              [facility]

[facility]



                                                                 Group

 

             BP Diastolic 2021-10-25 00:00:00 83 mm[Hg]                 [facility]

[facility]



                                                                 Group

 

             Height       2021-10-25 00:00:00 64 [in_i]                 [facility]

[facility]



                                                                 Group

 

             BMI (Body Mass 2021-10-25 00:00:00 38.1 kg/m2                [facility]

[facility]



             Index)                                              Group







Procedures







                Procedure       Date / Time Performed Performing Clinician [facility]

e

 

                US, transvaginal 2021-10-25 00:00:00                 [facility]

 

                Eye Surgery                                     [facility]

[facility]







Plan of Care







             Planned Activity Planned Date Details      Comments     Source

 

             Diagnostic Test 2021-10-25   pregnancy test,              [facility]

 [facility]



             Pending      00:00:00     urine [code =              Group



                                       pregnancy test,              



                                       urine]                    

 

             Diagnostic Test 2021-10-25   beta-HCG,                 [facility]

[facility]



             Pending      00:00:00     quantitative, serum              Group



                                       or plasma [code =              



                                       beta-HCG,                 



                                       quantitative, serum              



                                       or plasma]                

 

             Diagnostic Test 2021-10-25   progesterone, serum              [facility]



             Pending      00:00:00     [code =                   Group



                                       progesterone,              



                                       serum]                    

 

             Diagnostic Test 2021-10-25   [facility]              [facility]



             [facility]      00:00:00     total, serum [code              Group



                                       = testosterone,              



                                       total, serum]              

 

             Diagnostic Test 2021-10-25   lh + FSH, serum              [facility]



             Pending      00:00:00     [code = lh + FSH,              Group



                                       serum]                    

 

             Diagnostic Test 2021-10-25   [facility]              [facility]



             Pending      00:00:00     serum [code = TSH +              Group



                                       free T4, serum]              

 

             Diagnostic Test 2021-10-25   estradiol, [facility]              [facility]

[facility]



             Pending      00:00:00     [code = estradiol,              Group



                                       serum]                    

 

             Diagnostic Test 2021-10-25   17-hydroxyprogester              [facility]



             Pending      00:00:00     one, QN, serum              Group



                                       [code =                   



                                       17-hydroxyprogester              



                                       one, QN, serum]              

 

             Diagnostic Test 2021-10-25   HbA1c (hemoglobin              [facility]

[facility]



             Pending      00:00:00     A1c), blood [code =              Group



                                       HbA1c (hemoglobin              



                                       A1c), blood]              

 

             Diagnostic Test 2021-10-25   CBC w/ auto diff              [facility]

[facility]



             Pending      00:00:00     [code = CBC w/ auto              Group



                                       diff]                     

 

             Diagnostic Test 2021-10-25   dhea-sulfate, serum              [facility]

[facility]



             Pending      00:00:00     [code =                   Group



                                       dhea-sulfate,              



                                       serum]                    

 

             Diagnostic Test 2021-10-25   prolactin, serum              [facility]

[facility]



             Pending      00:00:00     [code = prolactin,              Group



                                       serum]                    

 

             Diagnostic Test 2021-10-25   glucose tolerance              [facility]

[facility]



             Pending      00:00:00     test, postprandial,              Group



                                       2-hour [code =              



                                       glucose tolerance              



                                       test, postprandial,              



                                       2-hour]                   







Encounters







        Start   End     Encounter Admission Attending Care    Care    Encounter 

Source



        Date/Time Date/Time Type    Type    Clinicians Facility Department ID   

   

 

        2021-10-25 2021-10-25 Outpatient         G_Pappas [facility]     [facility]     [id number] [facility]



        02:57:00 02:57:00                                         1025    



                                                                        Medical



                                                                        Group

 

        2021-10-25 2021-10-25 Whit                 [facility]     [place] -    [id number] [place]

[facility]



        00:00:00 00:00:00 Dony Gandara,                         Medical         [facility]

l



                        MD: [address]                         [facility]                          [facility]           



                        [address],                                         



                        [place], [place]                                              



                        [address]                                         



                        , Ph. [phone]                                         



                        [phone]                                         

 

        2021 Outpatient         G_Pappas [facility]     [id number] [facility]



        02:56:00 02:56:00                                         [id number]    



                                                                        Medical



                                                                        Group

 

        2021 Outpatient         G_Pappas [facility]     [facility]     [id number] [facility]



        09:54:00 09:54:00                                         0901    



                                                                        Medical



                                                                        Group

 

        2021 Outpatient         G_Pappas [facility]     [facility]     [id number] [place]



        02:26:00 02:26:00                                         [id number]    



                                                                        Medical



                                                                        Group







Results

This patient has no known results.

## 2021-12-06 NOTE — RAD REPORT
EXAM DESCRIPTION:  RAD - Foot Left 3 View - 12/6/2021 10:01 am

 

CLINICAL HISTORY:  PAIN

 

COMPARISON:  No comparisons

 

FINDINGS:  No bone or joint abnormality is detected.

## 2021-12-06 NOTE — ER
Nurse's Notes                                                                                     

 [facility]                                                                 

Name: Alyssa Chatterjee                                                                           

Age: 21 yrs                                                                                       

Sex: Female                                                                                       

: 2000                                                                                   

MRN: [id number]                                                                                   

Arrival Date: 2021                                                                          

Time: 08:27                                                                                       

Account#: [id number]                                                                            

Bed Waiting                                                                                       

Private MD:                                                                                       

Diagnosis: Contusion of left foot                                                                 

                                                                                                  

Presentation:                                                                                     

                                                                                             

08:34 Chief complaint: Patient states: left foot ran over by wheelchair on Thursday.          iw  

      Coronavirus screen: Vaccine status: Patient reports being unvaccinated. Ebola Screen:       

      Patient negative for fever greater than or equal to 101.5 degrees Fahrenheit, and           

      additional compatible Ebola Virus Disease symptoms Patient denies exposure to               

      infectious person. Patient denies travel to an Ebola-affected area in the 21 days           

      before illness onset. No symptoms or risks identified at this time. Initial Sepsis          

      Screen: Does the patient meet any 2 criteria? No. Patient's initial sepsis screen is        

      negative. Does the patient have a suspected source of infection? No. Patient's initial      

      sepsis screen is negative. Risk Assessment: Do you want to hurt yourself or someone         

      else? Patient reports no desire to harm self or others. Onset of symptoms was 2021.                                                                                    

08:34 Acuity: ROSARIO 4                                                                           iw  

08:34 Method Of Arrival: Wheelchair                                                           iw  

                                                                                                  

Triage Assessment:                                                                                

08:36 General: Appears in no apparent distress. comfortable, Behavior is calm, cooperative.   iw  

      Pain: Complains of pain in left foot.                                                       

09:00 Injury Description:.                                                                    iw  

                                                                                                  

OB/GYN:                                                                                           

08:36 LMP 2021                                                                              iw  

                                                                                                  

Historical:                                                                                       

- Allergies:                                                                                      

08:35 No Known Allergies;                                                                     iw  

- PMHx:                                                                                           

08:35 Anxiety; Asthma; depressive disorder; PCOS;                                             iw  

- PSHx:                                                                                           

08:35 None;                                                                                   iw  

                                                                                                  

- Immunization history:: Adult Immunizations up to date.                                          

- Social history:: Smoking status: Reported history of juuling and/or vaping.                     

                                                                                                  

                                                                                                  

Screening:                                                                                        

10:40 Abuse screen: Denies threats or abuse. Denies injuries from another. Nutritional        iw  

      screening: No deficits noted. Tuberculosis screening: No symptoms or risk factors           

      identified. Fall Risk None identified.                                                      

                                                                                                  

Assessment:                                                                                       

10:00 General: Appears in no apparent distress. Behavior is calm, cooperative. Pain:          iw  

      Complains of pain in left foot. Neuro: Level of Consciousness is awake, alert, obeys        

      commands, Oriented to person, place, time, situation, Moves all extremities. Full           

      function. Cardiovascular: Patient's skin is warm and dry. Respiratory: Respiratory          

      effort is even, unlabored, Respiratory pattern is regular, symmetrical. Derm: Skin is       

      intact, is healthy with good turgor. Musculoskeletal: Range of motion: intact in all        

      extremities.                                                                                

                                                                                                  

Vital Signs:                                                                                      

08:34  / 72; Pulse 98; Resp 18; Temp 98.6; Pulse Ox 100% ; Weight 97.52 kg; Height 5    iw  

      ft. 4 in. (162.56 cm); Pain 4/10;                                                           

08:34 Body Mass Index 36.90 (97.52 kg, 162.56 cm)                                             iw  

                                                                                                  

ED Course:                                                                                        

08:27 Patient arrived in ED.                                                                  as  

08:35 Triage completed.                                                                       iw  

08:35 Arm band placed on right wrist.                                                         iw  

08:37 Jeannie Persaud FNP-C is PHCP.                                                        kb  

08:37 Ajit Lara MD is Attending Physician.                                             kb  

09:43 X-ray completed. Portable x-ray completed in exam room. Patient tolerated procedure     md1 

      well.                                                                                       

10:00 Patient has correct armband on for positive identification.                             iw  

10:01 Foot Left 3 View XRAY In Process Unspecified.                                           EDMS

10:13 Mignon Chaudhari, RN is Primary Nurse.                                                   iw  

10:44 No provider procedures requiring assistance completed. Patient did not have IV access   iw  

      during this emergency room visit.                                                           

                                                                                                  

Administered Medications:                                                                         

No medications were administered                                                                  

                                                                                                  

                                                                                                  

Outcome:                                                                                          

10:31 Discharge ordered by MD.                                                                kb  

10:44 Discharged to home via wheelchair, with family.                                         iw  

10:44 Condition: good                                                                             

10:44 Discharge instructions given to patient, Instructed on discharge instructions, follow       

      up and referral plans. Demonstrated understanding of instructions, follow-up care.          

10:45 Patient left the ED.                                                                    iw  

                                                                                                  

Signatures:                                                                                       

Dispatcher [employer]                           EDMS                                                 

Jeannie Persaud FNP-C FNP-Ckb Martinez, Amelia                             as                                                   

Mignon Chaudhari, RN                     RN   iw                                                   

Summer Saini md1                                                  

                                                                                                  

**************************************************************************************************

## 2022-08-17 ENCOUNTER — HOSPITAL ENCOUNTER (EMERGENCY)
Dept: HOSPITAL 83 - ED | Age: 22
Discharge: HOME | End: 2022-08-17
Payer: COMMERCIAL

## 2022-08-17 VITALS — HEIGHT: 55 IN | WEIGHT: 246 LBS

## 2022-08-17 VITALS — DIASTOLIC BLOOD PRESSURE: 80 MMHG | SYSTOLIC BLOOD PRESSURE: 142 MMHG
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
## 2022-08-17 DIAGNOSIS — S29.011A: Primary | ICD-10-CM
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
## 2022-08-17 LAB
ALP SERPL-CCNC: 115 U/L (ref 45–117)
ALT SERPL W P-5'-P-CCNC: 22 U/L (ref 12–78)
AMPHETAMINES UR QL SCN: < 1000
AST SERPL-CCNC: 9 IU/L (ref 3–35)
BACTERIA #/AREA URNS HPF: (no result) /[HPF]
BARBITURATES UR QL SCN: < 200
BASOPHILS # BLD AUTO: 0.1 10*3/UL (ref 0–0.1)
BASOPHILS NFR BLD AUTO: 0.6 % (ref 0–1)
BENZODIAZ UR QL SCN: < 200
BUN SERPL-MCNC: 12 MG/DL (ref 7–24)
BZE UR QL SCN: < 300
CANNABINOIDS UR QL SCN: < 50
CHLORIDE SERPL-SCNC: 106 MMOL/L (ref 98–107)
CREAT SERPL-MCNC: 0.72 MG/DL (ref 0.55–1.02)
EOSINOPHIL # BLD AUTO: 0.3 10*3/UL (ref 0–0.4)
EOSINOPHIL # BLD AUTO: 3.4 % (ref 1–4)
EPI CELLS #/AREA URNS HPF: (no result) /[HPF]
ERYTHROCYTE [DISTWIDTH] IN BLOOD BY AUTOMATED COUNT: 13.1 % (ref 0–14.5)
HCT VFR BLD AUTO: 40.9 % (ref 37–47)
HGB UR QL STRIP: (no result)
LYMPHOCYTES # BLD AUTO: 2.1 10*3/UL (ref 1.3–4.4)
LYMPHOCYTES NFR BLD AUTO: 23.6 % (ref 27–41)
MCH RBC QN AUTO: 29.3 PG (ref 27–31)
MCHC RBC AUTO-ENTMCNC: 32.8 G/DL (ref 33–37)
MCV RBC AUTO: 89.3 FL (ref 81–99)
METHADONE UR QL SCN: < 300
MONOCYTES # BLD AUTO: 0.5 10*3/UL (ref 0.1–1)
MONOCYTES NFR BLD MANUAL: 5.9 % (ref 3–9)
NEUT #: 5.9 10*3/UL (ref 2.3–7.9)
NEUT %: 66.2 % (ref 47–73)
NRBC BLD QL AUTO: 0 10*3/UL (ref 0–0)
OPIATES UR QL SCN: < 300
PCP UR QL SCN: <  25
PH UR STRIP: 6 [PH] (ref 4.5–8)
PLATELET # BLD AUTO: 283 10*3/UL (ref 130–400)
PMV BLD AUTO: 10.3 FL (ref 9.6–12.3)
POTASSIUM SERPL-SCNC: 4.2 MMOL/L (ref 3.5–5.1)
PROT SERPL-MCNC: 7.2 GM/DL (ref 6.4–8.2)
RBC # BLD AUTO: 4.58 10*6/UL (ref 4.1–5.1)
RBC #/AREA URNS HPF: (no result) RBC/HPF (ref 0–2)
SODIUM SERPL-SCNC: 137 MMOL/L (ref 136–145)
SP GR UR: 1.02 (ref 1–1.03)
UROBILINOGEN UR STRIP-MCNC: 0.2 E.U./DL (ref 0–1)
WBC #/AREA URNS HPF: (no result) WBC/HPF (ref 0–5)
WBC NRBC COR # BLD AUTO: 8.9 10*3/UL (ref 4.8–10.8)

## 2022-11-17 ENCOUNTER — HOSPITAL ENCOUNTER (OUTPATIENT)
Dept: HOSPITAL 83 - US | Age: 22
Discharge: HOME | End: 2022-11-17
Attending: NURSE PRACTITIONER
Payer: COMMERCIAL

## 2022-11-17 DIAGNOSIS — N83.02: Primary | ICD-10-CM

## 2022-11-17 DIAGNOSIS — N83.01: ICD-10-CM

## 2022-11-17 DIAGNOSIS — N63.0: ICD-10-CM

## 2022-11-17 DIAGNOSIS — R93.89: ICD-10-CM

## 2022-12-01 ENCOUNTER — TELEPHONE (OUTPATIENT)
Dept: BREAST CENTER | Age: 22
End: 2022-12-01

## 2022-12-01 NOTE — TELEPHONE ENCOUNTER
Patient is a new referral to the breast clinic. Left message for patient to return call at [phone] to discuss referral information and schedule an appointment in the breast clinic. NEW PATIENT: breast pain. Will discuss details when patient returns call. Recent bilateral complete ultrasound in [facility] and right complete breast ultrasound in May 2020. Patient will need to obtain imaging on a disc and bring it in.  Referring: Burton Gomez

## 2023-02-01 ENCOUNTER — OFFICE VISIT (OUTPATIENT)
Dept: BREAST CENTER | Age: 23
End: 2023-02-01
Payer: COMMERCIAL

## 2023-02-01 VITALS
BODY MASS INDEX: 42.68 KG/M2 | OXYGEN SATURATION: 100 % | HEART RATE: 85 BPM | SYSTOLIC BLOOD PRESSURE: 138 MMHG | TEMPERATURE: 98.2 F | WEIGHT: 250 LBS | HEIGHT: 64 IN | DIASTOLIC BLOOD PRESSURE: 82 MMHG

## 2023-02-01 DIAGNOSIS — N64.4 PAIN OF BOTH BREASTS: Primary | ICD-10-CM

## 2023-02-01 PROCEDURE — 99203 OFFICE O/P NEW LOW 30 MIN: CPT | Performed by: SURGERY

## 2023-02-01 RX ORDER — ERGOCALCIFEROL 1.25 MG/1
CAPSULE ORAL
COMMUNITY
Start: 2022-12-29

## 2023-02-01 RX ORDER — METRONIDAZOLE 500 MG/1
TABLET ORAL
COMMUNITY
Start: 2023-01-25

## 2023-02-01 RX ORDER — QUETIAPINE FUMARATE 100 MG/1
TABLET, FILM COATED ORAL
COMMUNITY
Start: 2022-12-29

## 2023-02-01 RX ORDER — OMEPRAZOLE 40 MG/1
CAPSULE, DELAYED RELEASE ORAL
COMMUNITY
Start: 2022-11-17

## 2023-02-01 NOTE — PROGRESS NOTES
Date of Visit: 2/1/2023  New Patient    01/10/23-not seen  01/24/23-not seen  02/01/23      DIAGNOSIS:  1. (02/01/23) Breast pain  2. (02/01/23) Abnormal SBE  * RIGHT lump  3. History of nipple discharge    IMAGING/PROCEDURES:  1. (06/25/20) BILATERAL breast US (EL)  * For pain and discharge  * No reported findings  2. (11/17/22) RIGHT breast US (EL)  * For lump  * No reported findings      HISTORY OF PRESENT ILLNESS  Primus Grayson was in the office today for consultation regarding multiple issues related to her breast health. Kady Rangel presents for evaluation of bilateral breast pain, bilateral nipple discharge and bilateral abnormal findings on self breast examination. BREAST SYMPTOMS  Kady Rangel notes that she first developed discharge in approximately 2019. She describes the sometimes spontaneous but mostly only with manipulation bilateral discharge. The character this discharge is white to brown or clear. She does not notice any blood in the discharge. More recently she has developed discomfort and abnormal findings on self-examination. She describes significant discomfort mostly in the upper aspect of both breasts. She also describes a significant sensitivity wear any contact of the breast results in marked discomfort. Lastly she has developed concerns on self-examination also over the past 3 months. [address] has had no biopsies or breast surgeries. BREAST CANCER RISK FACTORS  The family history is notable for paternal grandmother with breast cancer, a paternal cousin who the patient reports had breast cancer at the age of 25 and a paternal great aunt who had breast cancer in her 45s. The patient's father had melanoma.     PAST MEDICAL/SURGICAL HISTORY  Past Medical History:   Diagnosis Date    Anxiety     Asthma     Depression     Insomnia     Palpitations     Tachycardia      Past Surgical History:   Procedure Laterality Date    STRABISMUS SURGERY MEDICATIONS    Current Outpatient Medications:     omeprazole (PRILOSEC) 40 MG delayed release capsule, TAKE 1 CAPSULE BY MOUTH ONCE A DAY 30 MINUTES BEFORE MORNING MEAL., Disp: , Rfl:     metroNIDAZOLE (FLAGYL) 500 MG tablet, TAKE ONE TABLET BY MOUTH TWICE A DAY FOR 7 DAYS, Disp: , Rfl:     vitamin D (ERGOCALCIFEROL) 1.25 MG (80148 UT) CAPS capsule, TAKE ONE CAPSULE BY MOUTH ONCE A WEEK, Disp: , Rfl:     QUEtiapine (SEROQUEL) 100 MG tablet, TAKE ONE TABLET BY MOUTH EVERY DAY (Patient not taking: Reported on 2/1/2023), Disp: , Rfl:     ALLERGIES  No Known Allergies    SOCIAL HISTORY   reports that she has never smoked. She has never used smokeless tobacco. She reports that she does not currently use alcohol. She reports that she does not use drugs. REVIEW OF SYSTEMS  [facility] ISSUES  [place]    PHYSICAL EXAMINATION  /82 (Site: Left Upper Arm, Position: Sitting, Cuff Size: Medium Adult)   Pulse 85   Temp 98.2 °F (36.8 °C) (Temporal)   Ht 5' 4\" (1.626 m)   Wt 250 lb (113.4 kg)   LMP 01/27/2023   SpO2 100%   BMI 42.91 kg/m²     COMPREHENSIVE BREAST EXAMINATION  There are no cervical, supraclavicular, or infraclavicular lymph nodes that are palpable. Inspection of the breast bilaterally demonstrates there to be no secondary signs of malignancy. There are no lesions of the nipple areola on either side. No spontaneous discharges witnessed. Palpation of the axilla bilaterally is without adenopathy. Palpation of the right breast does describe some asymmetric tissue in the upper slightly inner right breast.    Palpation of the left breast likewise demonstrates some focally prominent tissue in the upper slightly inner left breast.    Manipulation of the nipple areolar complex bilaterally demonstrates white discharge. We did test this for occult blood in the office and it was negative on both sides.     BREAST IMAGING STUDIES  Underwent a right breast ultrasound in November for the palpable findings. The images provided show no irregularities. PATHOLOGY  None    ASSESSMENT AND PLAN  Omid Josue was in the office today for consultation regarding multiple issues related to her breast health. I had a discussion today with [place] regarding the diagnosis as well as recommended treatment options. I did spend 30 minutes on the visit over half of which was dedicated education counseling and decision making. Some of this time does include chart and/or imaging review outside of the room time. With respect to her case specifically I informed [place] that I would like to address the issues independently and that I do not believe they are related. With respect to the nipple discharge I shared with her that I believe this is physiologic. She notes that it is rarely if ever spontaneous, it is bilateral and there is no report of bloody or even dark discharge. We confirm the absence of any blood with Hemoccult testing today. I shared with her that nipple discharge is a fairly common finding in women in their reproductive years. I do not believe believe it is related to any of her other breast symptoms and I have no specific recommendations. With respect to the findings on clinical breast examination I informed her that the findings do have benign characteristics however I would like to have both the right and left irregularity targeted with ultrasound. My assumption is that this will show benign breast tissue however I believe it is important to be sure. With respect to the breast pain I discussed with the patient that this too is a fairly common finding and not a sign of any underlying significant pathology in most patients. I shared with her that based on the location of the discomfort of her exam it may be related to her large breast size and she may benefit from a breast reduction.     Today in the office we also discussed the issue of her family history. She does report fairly unknown paternal relatives who have had breast cancer in her father by report has melanoma. We discussed the issue of genetic predisposition's to breast cancer. I suggested that her father get tested first.  While he discussed ordering testing for him I was informed that I could not do that because I am not technically his provider. I informed the patient the pass this information along to her father and his PCP. As with all patients we recommended a 6-month office visit for clinical stability. Assuming that there are no findings on ultrasounds and no genetic predisposition's we would then referred the patient to plastic surgery to discuss breast reduction. Until her next scheduled visit we encourage self-examination and asked her to contact us if there are any changes. This note was created with voice recognition software. Please excuse any grammatical errors that were not corrected and please contact me if there are any questions. [email or web address]  [phone]

## 2023-02-15 ENCOUNTER — HOSPITAL ENCOUNTER (OUTPATIENT)
Dept: HOSPITAL 83 - US | Age: 23
Discharge: HOME | End: 2023-02-15
Attending: FAMILY MEDICINE
Payer: COMMERCIAL

## 2023-02-15 DIAGNOSIS — R74.8: Primary | ICD-10-CM

## 2023-02-15 DIAGNOSIS — R11.0: ICD-10-CM

## 2023-02-15 DIAGNOSIS — R10.11: ICD-10-CM

## 2023-02-15 DIAGNOSIS — R19.7: ICD-10-CM

## 2023-02-16 LAB — ACTIN IGG SERPL-ACNC: 6 UNITS (ref 0–19)

## 2023-02-17 ENCOUNTER — HOSPITAL ENCOUNTER (OUTPATIENT)
Dept: GENERAL RADIOLOGY | Age: 23
End: 2023-02-17
Payer: COMMERCIAL

## 2023-02-17 DIAGNOSIS — N64.4 PAIN OF BOTH BREASTS: ICD-10-CM

## 2023-02-17 PROCEDURE — 76642 ULTRASOUND BREAST LIMITED: CPT

## 2023-02-20 ENCOUNTER — TELEPHONE (OUTPATIENT)
Dept: BREAST CENTER | Age: 23
End: 2023-02-20

## 2023-02-20 NOTE — TELEPHONE ENCOUNTER
Called patient and discussed neg US    She is going to see if her Dad has/will get genetic testing    Please call her for 6 month office visit

## 2023-03-09 ENCOUNTER — HOSPITAL ENCOUNTER (OUTPATIENT)
Dept: HOSPITAL 83 - NM | Age: 23
Discharge: HOME | End: 2023-03-09
Attending: INTERNAL MEDICINE
Payer: COMMERCIAL

## 2023-03-09 DIAGNOSIS — R94.8: ICD-10-CM

## 2023-03-09 DIAGNOSIS — R10.11: Primary | ICD-10-CM

## 2023-03-19 ENCOUNTER — HOSPITAL ENCOUNTER (EMERGENCY)
Dept: HOSPITAL 83 - ED | Age: 23
Discharge: HOME | End: 2023-03-19
Payer: COMMERCIAL

## 2023-03-19 VITALS — HEIGHT: 64 IN | BODY MASS INDEX: 40.97 KG/M2 | WEIGHT: 240 LBS

## 2023-03-19 VITALS — DIASTOLIC BLOOD PRESSURE: 74 MMHG | SYSTOLIC BLOOD PRESSURE: 124 MMHG

## 2023-03-19 DIAGNOSIS — K62.5: ICD-10-CM

## 2023-03-19 DIAGNOSIS — K52.9: Primary | ICD-10-CM

## 2023-03-19 LAB
BASOPHILS # BLD AUTO: 0 10*3/UL (ref 0–0.1)
BASOPHILS NFR BLD AUTO: 0.4 % (ref 0–1)
BUN SERPL-MCNC: 9 MG/DL (ref 9–23)
CHLORIDE SERPL-SCNC: 107 MMOL/L (ref 98–107)
EOSINOPHIL # BLD AUTO: 0.1 10*3/UL (ref 0–0.4)
EOSINOPHIL # BLD AUTO: 1.3 % (ref 1–4)
ERYTHROCYTE [DISTWIDTH] IN BLOOD BY AUTOMATED COUNT: 12.7 % (ref 0–14.5)
HCT VFR BLD AUTO: 40.2 % (ref 37–47)
LYMPHOCYTES # BLD AUTO: 2.4 10*3/UL (ref 1.3–4.4)
LYMPHOCYTES NFR BLD AUTO: 25.2 % (ref 27–41)
MCH RBC QN AUTO: 29.3 PG (ref 27–31)
MCHC RBC AUTO-ENTMCNC: 32.3 G/DL (ref 33–37)
MCV RBC AUTO: 90.7 FL (ref 81–99)
MONOCYTES # BLD AUTO: 0.5 10*3/UL (ref 0.1–1)
MONOCYTES NFR BLD MANUAL: 5.2 % (ref 3–9)
NEUT #: 6.3 10*3/UL (ref 2.3–7.9)
NEUT %: 67.6 % (ref 47–73)
NRBC BLD QL AUTO: 0 % (ref 0–0)
PLATELET # BLD AUTO: 285 10*3/UL (ref 130–400)
PMV BLD AUTO: 10.2 FL (ref 9.6–12.3)
POTASSIUM SERPL-SCNC: 3.9 MMOL/L (ref 3.4–5.1)
RBC # BLD AUTO: 4.43 10*6/UL (ref 4.1–5.1)
WBC NRBC COR # BLD AUTO: 9.4 10*3/UL (ref 4.8–10.8)

## 2023-08-23 ENCOUNTER — OFFICE VISIT (OUTPATIENT)
Dept: BREAST CENTER | Age: 23
End: 2023-08-23
Payer: COMMERCIAL

## 2023-08-23 VITALS
SYSTOLIC BLOOD PRESSURE: 136 MMHG | RESPIRATION RATE: 14 BRPM | HEIGHT: 64 IN | DIASTOLIC BLOOD PRESSURE: 84 MMHG | TEMPERATURE: 98.6 F | OXYGEN SATURATION: 96 % | BODY MASS INDEX: 43.71 KG/M2 | HEART RATE: 81 BPM | WEIGHT: 256 LBS

## 2023-08-23 DIAGNOSIS — N64.4 BREAST PAIN: Primary | ICD-10-CM

## 2023-08-23 DIAGNOSIS — Z80.41 FAMILY HISTORY OF MALIGNANT NEOPLASM OF OVARY: ICD-10-CM

## 2023-08-23 DIAGNOSIS — Z83.71 FAMILY HISTORY OF COLONIC POLYPS: ICD-10-CM

## 2023-08-23 DIAGNOSIS — Z80.3 FAMILY HISTORY OF MALIGNANT NEOPLASM OF BREAST: ICD-10-CM

## 2023-08-23 PROCEDURE — 36415 COLL VENOUS BLD VENIPUNCTURE: CPT | Performed by: SURGERY

## 2023-08-23 PROCEDURE — 99213 OFFICE O/P EST LOW 20 MIN: CPT | Performed by: SURGERY

## 2023-08-23 RX ORDER — FAMOTIDINE 40 MG/1
TABLET, FILM COATED ORAL
COMMUNITY
Start: 2023-08-21

## 2023-08-23 RX ORDER — ACETAMINOPHEN, ASPIRIN, CAFFEINE 250; 65; 250 MG/1; MG/1; MG/1
TABLET, FILM COATED ORAL
COMMUNITY
Start: 2023-07-29

## 2023-08-23 NOTE — PROGRESS NOTES
Date of visit: 8/23/2023    01/10/23-not seen  01/24/23-not seen  02/01/23 08/23/23      DIAGNOSIS:  1. (02/01/23) Breast pain  2. (02/01/23) Abnormal SBE  * RIGHT lump  3. History of nipple discharge  4. Family history of cancer  * Father/melanoma  * Paternal grandmother/breast  * Paternal aunt/breast-40s  * Paternal cousin/breast-18    IMAGING/PROCEDURES:  1. (06/25/20) BILATERAL breast US (EL)  * For pain and discharge  * No reported findings  2. (11/17/22) RIGHT breast US (EL)  * For lump  * No reported findings  3. (02/17/23) BILATERAL US: BIRADS-1  * RIGHT (1:00-3:00)  * LEFT (9:00-12:00)    Breast History  Mike Serna was in the office today for follow-up. Crispin Hunt was first evaluated 6 months ago. At that time she had several breast related symptoms including palpable concerns, breast discomfort and nipple discharge. At the same time it was notable that she had a family history somewhat concerning for a genetic predisposition to cancer. During her initial visit all the findings appear to be benign. She was asked to return for clinical follow-up. Breast Symptoms  Crispin Hunt is still having occasional breast discomfort. She reports no focal findings on clinical breast exam.  She does however report ongoing nipple discharge and has had at least 10 episodes of spontaneous bloody discharge on the left. Breast Examination  There are no cervical, supraclavicular, or infraclavicular lymph nodes that are palpable. Inspection of the breast bilaterally demonstrate there to be no secondary signs of malignancy. There are no lesions of the nipple or areola on either side. No spontaneous discharges with this. Palpation of the axilla bilaterally is without adenopathy. Palpation of the right breast demonstrates no focal findings.     Palpation of the left breast does demonstrate what can be best described as prominent tissue in the far lower outer quadrant of the left breast near the

## 2023-08-28 LAB
Lab: NEGATIVE
Lab: NORMAL

## 2023-08-30 ENCOUNTER — TELEPHONE (OUTPATIENT)
Dept: BARIATRICS/WEIGHT MGMT | Age: 23
End: 2023-08-30

## 2023-08-30 NOTE — TELEPHONE ENCOUNTER
Patient was in office today for initial consult. She had insurance questions, and asked if we were an [address] in network provider. I did not know so I called [facility]. Spoke to Macrina BRIDGES who confirmed that neither Dr. Nikki Dee nor Eligio Subramanian are in network with [address]. She explained that even if there is no [facility] surgeon/facility in network within a 50 mile radius, her benefits for out of network will still be at the tier 2 level. I explained all of this to patient and her . They would like to go home and call the insurance to make sure they have a good understanding of what the tier 2 vs tier 1 costs would be. If they decide to proceed with our center, she will call back and make another appointment.

## 2023-09-27 ENCOUNTER — TELEPHONE (OUTPATIENT)
Dept: BREAST CENTER | Age: 23
End: 2023-09-27

## 2023-10-10 ENCOUNTER — TELEPHONE (OUTPATIENT)
Dept: BREAST CENTER | Age: 23
End: 2023-10-10

## 2023-10-10 DIAGNOSIS — N64.52 BLOODY DISCHARGE FROM NIPPLE: Primary | ICD-10-CM

## 2023-10-10 NOTE — TELEPHONE ENCOUNTER
Talked with [place]. She is now free on Fridays.     Still having BILATERAL bloody nipple discharge    I will put in order for mammo and US and then MRI

## 2024-01-31 ENCOUNTER — TELEPHONE (OUTPATIENT)
Dept: BREAST CENTER | Age: 24
End: 2024-01-31

## 2024-01-31 NOTE — TELEPHONE ENCOUNTER
Attempted to call patient about rescheduling her breast imaging for which she did not show. Patient is a new referral to the breast clinic. Was told that patient was not available and to try again at another time.    Electronically signed by Amelia Moreno RN on 1/31/24 at 1:51 PM EST  ]

## 2024-02-21 ENCOUNTER — TELEPHONE (OUTPATIENT)
Dept: BREAST CENTER | Age: 24
End: 2024-02-21

## 2024-02-21 NOTE — TELEPHONE ENCOUNTER
Called once again and left detailed message with call back number to discuss rescheduling her missed breast imaging appointment.     Electronically signed by Amelia Moreno RN on 2/21/24 at 2:53 PM EST

## 2024-02-29 ENCOUNTER — TELEPHONE (OUTPATIENT)
Dept: BREAST CENTER | Age: 24
End: 2024-02-29

## 2024-02-29 NOTE — TELEPHONE ENCOUNTER
We have not heard back from patient in regards to rescheduling her imaging. A letter will be sent to her listed address as another way to try and contact her.